# Patient Record
Sex: MALE | Race: WHITE | NOT HISPANIC OR LATINO | Employment: FULL TIME | URBAN - METROPOLITAN AREA
[De-identification: names, ages, dates, MRNs, and addresses within clinical notes are randomized per-mention and may not be internally consistent; named-entity substitution may affect disease eponyms.]

---

## 2024-01-04 PROBLEM — E78.5 HYPERLIPIDEMIA: Status: ACTIVE | Noted: 2024-01-04

## 2024-01-04 PROBLEM — I10 BENIGN ESSENTIAL HYPERTENSION: Status: ACTIVE | Noted: 2024-01-04

## 2024-01-04 PROBLEM — F17.200 TOBACCO DEPENDENCY: Status: ACTIVE | Noted: 2024-01-04

## 2024-01-04 PROBLEM — I25.10 CAD (CORONARY ARTERY DISEASE): Status: ACTIVE | Noted: 2024-01-04

## 2024-01-04 RX ORDER — LISINOPRIL 10 MG/1
1 TABLET ORAL DAILY
COMMUNITY

## 2024-01-04 RX ORDER — ATORVASTATIN CALCIUM 40 MG/1
1 TABLET, FILM COATED ORAL DAILY
COMMUNITY
Start: 2021-06-21

## 2024-01-04 RX ORDER — ASPIRIN 81 MG/1
1 TABLET ORAL DAILY
COMMUNITY
Start: 2021-06-17

## 2024-01-04 RX ORDER — METOPROLOL TARTRATE 25 MG/1
0.5 TABLET, FILM COATED ORAL 2 TIMES DAILY
COMMUNITY
Start: 2021-06-21

## 2024-01-04 RX ORDER — VARENICLINE TARTRATE 1 MG/1
1 TABLET, FILM COATED ORAL SEE ADMIN INSTRUCTIONS
COMMUNITY
Start: 2021-06-23 | End: 2024-01-10 | Stop reason: WASHOUT

## 2024-01-04 RX ORDER — PANTOPRAZOLE SODIUM 40 MG/1
1 TABLET, DELAYED RELEASE ORAL DAILY
COMMUNITY
Start: 2021-06-21

## 2024-01-10 ENCOUNTER — OFFICE VISIT (OUTPATIENT)
Dept: CARDIOLOGY | Facility: CLINIC | Age: 64
End: 2024-01-10
Payer: COMMERCIAL

## 2024-01-10 VITALS
HEART RATE: 60 BPM | HEIGHT: 71 IN | SYSTOLIC BLOOD PRESSURE: 130 MMHG | WEIGHT: 192 LBS | DIASTOLIC BLOOD PRESSURE: 88 MMHG | BODY MASS INDEX: 26.88 KG/M2

## 2024-01-10 DIAGNOSIS — R07.89 OTHER CHEST PAIN: ICD-10-CM

## 2024-01-10 DIAGNOSIS — I10 ESSENTIAL HYPERTENSION: Primary | ICD-10-CM

## 2024-01-10 PROBLEM — K64.0 FIRST DEGREE HEMORRHOIDS: Status: ACTIVE | Noted: 2017-07-25

## 2024-01-10 PROBLEM — F17.200 TOBACCO DEPENDENCY: Status: RESOLVED | Noted: 2024-01-04 | Resolved: 2024-01-10

## 2024-01-10 PROBLEM — E78.2 MIXED HYPERLIPIDEMIA: Status: ACTIVE | Noted: 2021-07-26

## 2024-01-10 PROBLEM — K57.30 DIVERTICULOSIS OF LARGE INTESTINE WITHOUT DIVERTICULITIS: Status: ACTIVE | Noted: 2017-07-25

## 2024-01-10 PROBLEM — K21.9 GASTROESOPHAGEAL REFLUX DISEASE WITHOUT ESOPHAGITIS: Status: ACTIVE | Noted: 2017-06-08

## 2024-01-10 PROCEDURE — 3075F SYST BP GE 130 - 139MM HG: CPT | Performed by: INTERNAL MEDICINE

## 2024-01-10 PROCEDURE — 99214 OFFICE O/P EST MOD 30 MIN: CPT | Performed by: INTERNAL MEDICINE

## 2024-01-10 PROCEDURE — 93000 ELECTROCARDIOGRAM COMPLETE: CPT | Performed by: INTERNAL MEDICINE

## 2024-01-10 PROCEDURE — 1036F TOBACCO NON-USER: CPT | Performed by: INTERNAL MEDICINE

## 2024-01-10 PROCEDURE — 3079F DIAST BP 80-89 MM HG: CPT | Performed by: INTERNAL MEDICINE

## 2024-01-10 RX ORDER — UBIDECARENONE 75 MG
1 CAPSULE ORAL
COMMUNITY

## 2024-01-10 RX ORDER — IBUPROFEN 800 MG/1
800 TABLET ORAL EVERY 8 HOURS PRN
COMMUNITY
Start: 2023-10-10

## 2024-01-10 RX ORDER — ZINC GLUCONATE 50 MG
50 TABLET ORAL
COMMUNITY

## 2024-01-10 RX ORDER — MELOXICAM 15 MG
15 TABLET ORAL
COMMUNITY
Start: 2023-08-23 | End: 2024-08-22

## 2024-01-10 RX ORDER — NITROGLYCERIN 0.4 MG/1
0.4 TABLET SUBLINGUAL EVERY 5 MIN PRN
COMMUNITY
Start: 2023-01-04

## 2024-01-10 ASSESSMENT — ENCOUNTER SYMPTOMS
DEPRESSION: 0
LOSS OF SENSATION IN FEET: 0
OCCASIONAL FEELINGS OF UNSTEADINESS: 0

## 2024-01-10 NOTE — PROGRESS NOTES
"Chief Complaint:   Annual Exam (Yearly check up)     History Of Present Illness:    Jhon Aranda is a 63 y.o. male with history of tobacco use, now in remission, hyperlipidemia, who presented with unstable angina and abnormal cardiac enzymes. Subsequent cardiac cath without significant coronary artery disease. Now here for follow-up. Was having intermittent pain for 3 weeks prior to presentation to the hospital. We have started beta-blockers, statins and aspirin and now he is symptom-free. He has quit smoking in spring 2022.  We started him on a trial of sublingual nitroglycerin at the last visit and that seem to be helping his chest pain symptoms.     He has borderline diabetes this is being monitored with a hemoglobin A1c of 6.1. He was also started on lisinopril by primary care physician recently. His CT calcium score was low. Some nonspecific nodules were found, the radiologist feels this is mostly benign. I have given the report to Mr. Aranda to discuss with his PCP as well.     He tells me he continues to have occasional chest pain associated with left arm numbness happens approximately once a month but could be pretty severe associated with diaphoresis. Relieved with water. Does not last more than 5 to 10 minutes. Not exertional at all.  Also nitroglycerin seems to help.     ECG shows sinus rhythm.  He monitors his blood pressure at home and he assures me that this is mostly under 130/80.     Last Recorded Vitals:  Vitals:    01/10/24 1104   BP: 130/88   BP Location: Right arm   Pulse: 60   Weight: 87.1 kg (192 lb)   Height: 1.803 m (5' 11\")       Past Medical History:  He has a past medical history of Personal history of other specified conditions (06/23/2021).    Past Surgical History:  He has no past surgical history on file.      Social History:  He reports that he has never smoked. He has never been exposed to tobacco smoke. He has never used smokeless tobacco. No history on file for alcohol use and " drug use.    Family History:  No family history on file.     Allergies:  Patient has no known allergies.    Outpatient Medications:  Current Outpatient Medications   Medication Instructions    aspirin 81 mg EC tablet 1 tablet, oral, Daily    atorvastatin (Lipitor) 40 mg tablet 1 tablet, oral, Daily    cyanocobalamin (Vitamin B-12) 500 mcg tablet 1 tablet, oral, Daily RT    ibuprofen 800 mg, oral, Every 8 hours PRN    lisinopril 10 mg tablet 1 tablet, oral, Daily    metoprolol tartrate (Lopressor) 25 mg tablet 0.5 tablets, oral, 2 times daily    Mobic 15 mg, oral, Daily RT    nitroglycerin (NITROSTAT) 0.4 mg, sublingual, Every 5 min PRN    pantoprazole (ProtoNix) 40 mg EC tablet 1 tablet, oral, Daily    varenicline (CHANTIX) 1 mg, oral, See admin instructions    zinc gluconate 50 mg, oral, Daily RT       Physical Exam:  Physical Exam  Vitals reviewed.   Constitutional:       Appearance: Normal appearance.   Neck:      Vascular: No carotid bruit or JVD.   Cardiovascular:      Rate and Rhythm: Normal rate and regular rhythm.      Heart sounds: Normal heart sounds, S1 normal and S2 normal. No murmur heard.  Pulmonary:      Effort: Pulmonary effort is normal.      Breath sounds: Normal breath sounds.   Abdominal:      General: Abdomen is flat. Bowel sounds are normal.      Palpations: Abdomen is soft.   Musculoskeletal:      Right lower leg: No edema.      Left lower leg: No edema.   Skin:     General: Skin is warm.   Neurological:      Mental Status: He is alert. Mental status is at baseline.   Psychiatric:         Mood and Affect: Mood normal.         Behavior: Behavior normal.           Last Labs:  CBC -  Lab Results   Component Value Date    WBC 10.0 06/18/2021    HGB 15.1 06/18/2021    HCT 45.6 06/18/2021    MCV 91 06/18/2021     06/18/2021       CMP -  Lab Results   Component Value Date    CALCIUM 8.7 06/17/2021       LIPID PANEL -   Lab Results   Component Value Date    CHOL 210 (H) 06/17/2021    TRIG 129  "06/17/2021    HDL 33.9 (A) 06/17/2021    CHHDL 6.2 (A) 06/17/2021    LDLF 150 (H) 06/17/2021    VLDL 26 06/17/2021       RENAL FUNCTION PANEL -   Lab Results   Component Value Date    GLUCOSE 128 (H) 06/17/2021     06/17/2021    K 4.2 06/17/2021     06/17/2021    CO2 25 06/17/2021    ANIONGAP 10 06/17/2021    BUN 15 06/17/2021    CREATININE 0.84 06/17/2021    CALCIUM 8.7 06/17/2021        Lab Results   Component Value Date    HGBA1C 5.9 (A) 08/01/2022       Last Cardiology Tests:  ECG:  No results found for this or any previous visit from the past 1095 days.      Echo:  No results found for this or any previous visit from the past 1095 days.      Ejection Fractions:  No results found for: \"EF\"    Cath:  No results found for this or any previous visit from the past 1095 days.      Stress Test:  No results found for this or any previous visit from the past 1095 days.      Cardiac Imaging:  CT HEART CALCIUM SCORING WO IV CONTRAST 07/20/2021          Assessment/Plan   In summary Mr. Aranda is a 63-year-old gentleman with multiple cardiovascular risk factors who recently presented with unstable angina type of symptoms, but no obstructive disease noted by the time cardiac catheterization was performed.     We will focus on risk factor modification. Follow-up on CT calcium score-this was normal to low, continue statins - and now favorable.  Blood pressure is generally well-controlled he states he also follows up with his PCP for this.      Congratulated him on smoking cessation     He has hypertension and on 2 different medications. Home/self monitoring of BP encouraged. Goal BP discussed. Lifestyle advise given. He will continue lifestyle modification for the next 6 months. Goal blood pressure less than 130/80, ideally less than 120/70. If remains elevated at that point plan on increasing current antihypertensive doses. He wants to follow-up with PCP about that.     He is having ongoing atypical chest pain. " Possibly coronary spasm or esophageal spasm.  Seems to be responding to sublingual nitroglycerin.  I offered a GI evaluation but he declines at this time we will continue current management.    Bolivar Hyman MD

## 2025-01-15 ENCOUNTER — APPOINTMENT (OUTPATIENT)
Dept: CARDIOLOGY | Facility: CLINIC | Age: 65
End: 2025-01-15
Payer: COMMERCIAL

## 2025-01-15 VITALS
HEART RATE: 70 BPM | SYSTOLIC BLOOD PRESSURE: 160 MMHG | WEIGHT: 197.8 LBS | DIASTOLIC BLOOD PRESSURE: 82 MMHG | BODY MASS INDEX: 27.59 KG/M2

## 2025-01-15 DIAGNOSIS — I10 ESSENTIAL HYPERTENSION: ICD-10-CM

## 2025-01-15 DIAGNOSIS — F17.200 TOBACCO DEPENDENCY: Primary | ICD-10-CM

## 2025-01-15 PROCEDURE — 99214 OFFICE O/P EST MOD 30 MIN: CPT | Mod: 25 | Performed by: INTERNAL MEDICINE

## 2025-01-15 PROCEDURE — 93005 ELECTROCARDIOGRAM TRACING: CPT | Performed by: INTERNAL MEDICINE

## 2025-01-15 PROCEDURE — 3079F DIAST BP 80-89 MM HG: CPT | Performed by: INTERNAL MEDICINE

## 2025-01-15 PROCEDURE — 3077F SYST BP >= 140 MM HG: CPT | Performed by: INTERNAL MEDICINE

## 2025-01-15 PROCEDURE — 99214 OFFICE O/P EST MOD 30 MIN: CPT | Performed by: INTERNAL MEDICINE

## 2025-01-15 RX ORDER — AMLODIPINE BESYLATE 5 MG/1
5 TABLET ORAL DAILY
Qty: 30 TABLET | Refills: 11 | Status: SHIPPED | OUTPATIENT
Start: 2025-01-15 | End: 2026-01-15

## 2025-01-15 RX ORDER — LISINOPRIL 20 MG/1
20 TABLET ORAL DAILY
Qty: 90 TABLET | Refills: 3 | Status: SHIPPED | OUTPATIENT
Start: 2025-01-15 | End: 2026-01-15

## 2025-01-15 RX ORDER — ROSUVASTATIN CALCIUM 40 MG/1
40 TABLET, COATED ORAL DAILY
Qty: 90 TABLET | Refills: 3 | Status: SHIPPED | OUTPATIENT
Start: 2025-01-15 | End: 2026-01-15

## 2025-01-15 ASSESSMENT — ENCOUNTER SYMPTOMS
DEPRESSION: 0
LOSS OF SENSATION IN FEET: 0
OCCASIONAL FEELINGS OF UNSTEADINESS: 0

## 2025-01-15 NOTE — PROGRESS NOTES
Chief Complaint:   No chief complaint on file.     History Of Present Illness:    Jhon Aranda is a 64 y.o. male presenting for annual follow-up.    He has a past medical history of tobacco use, quit and then relapsed, hyperlipidemia, who initially presented with unstable angina and abnormal cardiac enzymes. Subsequent cardiac cath without significant coronary artery disease. We have started beta-blockers, statins and aspirin and now he is symptom-free. We started him on a trial of sublingual nitroglycerin [id number] and that seem to be helping his chest pain symptoms.     He has borderline diabetes this is being monitored with a hemoglobin A1c of 6.1. He was also started on lisinopril by primary care physician recently. His CT calcium score was low. Some nonspecific nodules were found, the radiologist feels this is mostly benign. I have given the report to Mr. Aranda to discuss with his PCP as well.     He tells me he continues to have occasional chest pain associated with left arm numbness happens approximately once a month but could be pretty severe associated with diaphoresis. Relieved with water. Does not last more than 5 to 10 minutes. Not exertional at all.  Also nitroglycerin seems to help.     ECG shows sinus rhythm.      He has retired last year and has gained some weight since and also have become more sedentary.  I reviewed his lipid profile and there has been some significant changes to LDL and triglycerides both having increased.  Additionally his blood pressure is quite high today.  Last Recorded Vitals:  Vitals:    01/15/25 1052   BP: 160/82   BP Location: Left arm   Pulse: 70   Weight: 89.7 kg (197 lb 12.8 oz)       Past Medical History:  He has a past medical history of Personal history of other specified conditions (06/23/2021) and Tobacco dependency (01/04/2024).    Past Surgical History:  He has no past surgical history on file.      Social History:  He reports that he has never smoked. He has  never been exposed to tobacco smoke. He has never used smokeless tobacco. No history on file for alcohol use and drug use.    Family History:  No family history on file.     Allergies:  Patient has no known allergies.    Outpatient Medications:  Current Outpatient Medications   Medication Instructions    aspirin 81 mg EC tablet 1 tablet, Daily    atorvastatin (Lipitor) 40 mg tablet 1 tablet, Daily    cyanocobalamin (Vitamin B-12) 500 mcg tablet 1 tablet, Daily RT    ibuprofen 800 mg, Every 8 hours PRN    lisinopril 10 mg tablet 1 tablet, Daily    metoprolol tartrate (Lopressor) 25 mg tablet 0.5 tablets, 2 times daily    nitroglycerin (NITROSTAT) 0.4 mg, Every 5 min PRN    pantoprazole (ProtoNix) 40 mg EC tablet 1 tablet, Daily    zinc gluconate 50 mg, Daily RT       Physical Exam:  Physical Exam  Vitals reviewed.   Constitutional:       Appearance: Normal appearance.   Neck:      Vascular: No carotid bruit or JVD.   Cardiovascular:      Rate and Rhythm: Normal rate and regular rhythm.      Heart sounds: Normal heart sounds, S1 normal and S2 normal. No murmur heard.  Pulmonary:      Effort: Pulmonary effort is normal.      Breath sounds: Normal breath sounds.   Abdominal:      General: Abdomen is flat. Bowel sounds are normal.      Palpations: Abdomen is soft.   Musculoskeletal:      Right lower leg: No edema.      Left lower leg: No edema.   Skin:     General: Skin is warm.   Neurological:      Mental Status: He is alert. Mental status is at baseline.   Psychiatric:         Mood and Affect: Mood normal.         Behavior: Behavior normal.           Last Labs:  CBC -  Lab Results   Component Value Date    WBC 10.0 06/18/2021    HGB 15.1 06/18/2021    HCT 45.6 06/18/2021    MCV 91 06/18/2021     06/18/2021       CMP -  Lab Results   Component Value Date    CALCIUM 8.7 06/17/2021       LIPID PANEL -   Lab Results   Component Value Date    CHOL 210 (H) 06/17/2021    TRIG 129 06/17/2021    HDL 33.9 (A) 06/17/2021  "   CHHDL 6.2 (A) 06/17/2021    LDLF 150 (H) 06/17/2021    VLDL 26 06/17/2021       RENAL FUNCTION PANEL -   Lab Results   Component Value Date    GLUCOSE 128 (H) 06/17/2021     06/17/2021    K 4.2 06/17/2021     06/17/2021    CO2 25 06/17/2021    ANIONGAP 10 06/17/2021    BUN 15 06/17/2021    CREATININE 0.84 06/17/2021    CALCIUM 8.7 06/17/2021        Lab Results   Component Value Date    HGBA1C 5.9 (A) 08/01/2022       Last Cardiology Tests:  ECG:  ECG 12 Lead 01/10/2024      Echo:  No results found for this or any previous visit from the past 1095 days.      Ejection Fractions:  No results found for: \"EF\"    Cath:  No results found for this or any previous visit from the past 1095 days.      Stress Test:  No results found for this or any previous visit from the past 1095 days.      Cardiac Imaging:  No results found for this or any previous visit from the past 1095 days.          Assessment/Plan   In summary Mr. Aranda is a 64-year-old gentleman with multiple cardiovascular risk factors, story of unstable angina type of symptoms, but no obstructive disease noted by the time cardiac catheterization was performed.     We will focus on risk factor modification. Follow-up on CT calcium score-this was normal to low, continue statins -increase the dose and switch to Crestor as LDL is high.  Also triglycerides running high.  Lifestyle modification discussed.  May have to add Zetia if this continues to be elevated.       He has hypertension and on first-line agent.  Increased lisinopril and added amlodipine.  Home/self monitoring of BP encouraged. Goal BP discussed. Lifestyle advise given. He will continue lifestyle modification for the next 6 months. Goal blood pressure less than 130/80, ideally less than 120/70.      He has history of atypical chest pain. Possibly coronary spasm or esophageal spasm.  Seems to be responding to sublingual nitroglycerin.  I offered a GI evaluation but he declines at this time " we will continue current management.    He initially quit tobacco but then relapsed.  He remains motivated to quit.  We discussed this today.         Bolivar Hyman MD

## 2025-01-28 LAB
ATRIAL RATE: 70 BPM
P AXIS: 53 DEGREES
P OFFSET: 192 MS
P ONSET: 140 MS
PR INTERVAL: 164 MS
Q ONSET: 222 MS
QRS COUNT: 11 BEATS
QRS DURATION: 86 MS
QT INTERVAL: 386 MS
QTC CALCULATION(BAZETT): 416 MS
QTC FREDERICIA: 406 MS
R AXIS: 30 DEGREES
T AXIS: 36 DEGREES
T OFFSET: 415 MS
VENTRICULAR RATE: 70 BPM